# Patient Record
Sex: FEMALE | Race: OTHER | URBAN - METROPOLITAN AREA
[De-identification: names, ages, dates, MRNs, and addresses within clinical notes are randomized per-mention and may not be internally consistent; named-entity substitution may affect disease eponyms.]

---

## 2024-01-04 ENCOUNTER — OFFICE VISIT (OUTPATIENT)
Dept: ORTHOPEDIC SURGERY | Age: 54
End: 2024-01-04
Payer: COMMERCIAL

## 2024-01-04 VITALS — BODY MASS INDEX: 26.4 KG/M2 | HEIGHT: 63 IN | WEIGHT: 149 LBS

## 2024-01-04 DIAGNOSIS — M51.36 LUMBAR DEGENERATIVE DISC DISEASE: Primary | ICD-10-CM

## 2024-01-04 DIAGNOSIS — M54.16 LUMBAR RADICULOPATHY: ICD-10-CM

## 2024-01-04 PROCEDURE — 99204 OFFICE O/P NEW MOD 45 MIN: CPT | Performed by: PHYSICIAN ASSISTANT

## 2024-01-04 RX ORDER — PREDNISONE 10 MG/1
10 TABLET ORAL SEE ADMIN INSTRUCTIONS
Qty: 48 EACH | Refills: 0 | Status: SHIPPED | OUTPATIENT
Start: 2024-01-04 | End: 2024-01-16

## 2024-01-04 RX ORDER — GABAPENTIN 300 MG/1
300 CAPSULE ORAL 2 TIMES DAILY
Qty: 60 CAPSULE | Refills: 2 | Status: SHIPPED | OUTPATIENT
Start: 2024-01-04 | End: 2024-04-03

## 2024-01-04 RX ORDER — MELOXICAM 15 MG/1
15 TABLET ORAL DAILY
COMMUNITY
Start: 2023-12-30

## 2024-01-04 RX ORDER — FAMOTIDINE 20 MG/1
1 TABLET, FILM COATED ORAL NIGHTLY PRN
COMMUNITY
Start: 2020-09-18

## 2024-01-04 RX ORDER — CYCLOBENZAPRINE HCL 5 MG
TABLET ORAL
COMMUNITY
Start: 2023-12-30

## 2024-01-04 RX ORDER — HYDROCODONE BITARTRATE AND ACETAMINOPHEN 7.5; 325 MG/1; MG/1
1 TABLET ORAL EVERY 6 HOURS PRN
COMMUNITY
Start: 2023-12-19

## 2024-01-04 RX ORDER — PREDNISONE 10 MG/1
TABLET ORAL
COMMUNITY
Start: 2023-12-17 | End: 2024-01-04

## 2024-01-04 NOTE — PROGRESS NOTES
Name: Teresa Allen  YOB: 1970  Gender: female  MRN: [id number]    CC:   Chief Complaint   Patient presents with    New Patient     Low back pain          HPI:   Teresa Allen is a 53 y.o. female with a PMHx of no significant past medical history.      They present here for evaluation of back pain rating to the left leg.  She has had this in years past off and on, but it is severe now.  This started after she bent over to pick something up about 3 weeks ago.  Is been progressively getting worse since.  She has been to the emergency room or urgent care multiple times for this.  She has tried Advil, Flexeril, Mobic, Norco.  She does have numbness and tingling in the left leg.  Her symptoms are more on the anterior thigh lower leg.  She works as a .    Pain Scale: Up to a 10 out of 10  ADL's affected: Working, sleeping  Conservative treatment attempted: Anti-inflammatories, muscle relaxers          Past Medical History Includes: No past medical history on file., No past surgical history on file.  Family History: No family history on file.   Social History:   Social History     Tobacco Use    Smoking status: Not on file    Smokeless tobacco: Not on file   Substance Use Topics    Alcohol use: Not on file       ALLERGIES:   Allergies   Allergen Reactions    Shellfish Allergy Other (See Comments)        Patient Medications    Current Outpatient Medications   Medication Sig Dispense Refill    predniSONE 10 MG (21) TBPK TAKE AS DIRECTED      meloxicam (MOBIC) 15 MG tablet Take 1 tablet by mouth daily      HYDROcodone-acetaminophen (NORCO) 7.5-325 MG per tablet Take 1 tablet by mouth every 6 hours as needed. Max Daily Amount: 4 tablets      famotidine (PEPCID) 20 MG tablet Take 1 tablet by mouth nightly as needed      cyclobenzaprine (FLEXERIL) 5 MG tablet TAKE 1 TABLET BY MOUTH THREE TIMES A DAY AS NEEDED FOR PAIN FOR 7 DAYS       No current facility-administered